# Patient Record
Sex: MALE | HISPANIC OR LATINO | Employment: FULL TIME | URBAN - METROPOLITAN AREA
[De-identification: names, ages, dates, MRNs, and addresses within clinical notes are randomized per-mention and may not be internally consistent; named-entity substitution may affect disease eponyms.]

---
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
## 2021-03-14 ENCOUNTER — APPOINTMENT (OUTPATIENT)
Dept: GENERAL RADIOLOGY | Facility: CLINIC | Age: 21
End: 2021-03-14
Attending: EMERGENCY MEDICINE
Payer: COMMERCIAL

## 2021-03-14 ENCOUNTER — HOSPITAL ENCOUNTER (EMERGENCY)
Facility: CLINIC | Age: 21
Discharge: HOME | End: 2021-03-14
Attending: EMERGENCY MEDICINE | Admitting: EMERGENCY MEDICINE
Payer: COMMERCIAL

## 2021-03-14 VITALS
OXYGEN SATURATION: 99 % | HEART RATE: 85 BPM | TEMPERATURE: 97.9 F | RESPIRATION RATE: 20 BRPM | SYSTOLIC BLOOD PRESSURE: 122 MMHG | DIASTOLIC BLOOD PRESSURE: 80 MMHG

## 2021-03-14 DIAGNOSIS — S20.219A CONTUSION OF CHEST WALL, UNSPECIFIED LATERALITY, INITIAL ENCOUNTER: ICD-10-CM

## 2021-03-14 PROCEDURE — 99284 EMERGENCY DEPT VISIT MOD MDM: CPT | Mod: 25

## 2021-03-14 PROCEDURE — 71120 X-RAY EXAM BREASTBONE 2/>VWS: CPT

## 2021-03-14 PROCEDURE — 250N000013 HC RX MED GY IP 250 OP 250 PS 637: Performed by: EMERGENCY MEDICINE

## 2021-03-14 PROCEDURE — 71046 X-RAY EXAM CHEST 2 VIEWS: CPT

## 2021-03-14 RX ORDER — IBUPROFEN 600 MG/1
600 TABLET, FILM COATED ORAL ONCE
Status: COMPLETED | OUTPATIENT
Start: 2021-03-14 | End: 2021-03-14

## 2021-03-14 RX ADMIN — IBUPROFEN 600 MG: 600 TABLET, FILM COATED ORAL at 01:36

## 2021-03-14 ASSESSMENT — ENCOUNTER SYMPTOMS: SHORTNESS OF BREATH: 1
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
## 2023-06-15 ENCOUNTER — HOSPITAL ENCOUNTER (EMERGENCY)
Facility: CLINIC | Age: 23
Discharge: HOME | End: 2023-06-15
Attending: EMERGENCY MEDICINE | Admitting: EMERGENCY MEDICINE
Payer: COMMERCIAL

## 2023-06-15 VITALS
TEMPERATURE: 97.8 F | DIASTOLIC BLOOD PRESSURE: 89 MMHG | RESPIRATION RATE: 16 BRPM | OXYGEN SATURATION: 98 % | SYSTOLIC BLOOD PRESSURE: 156 MMHG | HEART RATE: 85 BPM

## 2023-06-15 DIAGNOSIS — H16.001 CORNEAL ULCER OF RIGHT EYE: ICD-10-CM

## 2023-06-15 DIAGNOSIS — H10.9 CONJUNCTIVITIS OF RIGHT EYE, UNSPECIFIED CONJUNCTIVITIS TYPE: ICD-10-CM

## 2023-06-15 PROCEDURE — 99283 EMERGENCY DEPT VISIT LOW MDM: CPT

## 2023-06-15 RX ORDER — OFLOXACIN 3 MG/ML
2 SOLUTION/ DROPS OPHTHALMIC 4 TIMES DAILY
Qty: 3 ML | Refills: 0 | Status: SHIPPED | OUTPATIENT
Start: 2023-06-15 | End: 2023-06-22

## 2023-06-15 RX ORDER — PROPARACAINE HYDROCHLORIDE 5 MG/ML
1 SOLUTION/ DROPS OPHTHALMIC ONCE
Status: DISCONTINUED | OUTPATIENT
Start: 2023-06-15 | End: 2023-06-16 | Stop reason: HOSPADM

## 2023-06-15 RX ORDER — ERYTHROMYCIN 5 MG/G
0.5 OINTMENT OPHTHALMIC AT BEDTIME
Qty: 3.5 G | Refills: 0 | Status: SHIPPED | OUTPATIENT
Start: 2023-06-15 | End: 2023-06-22

## 2023-06-15 ASSESSMENT — VISUAL ACUITY
OD: 20/15
OS: 20/20
OD: 20/15
OS: 20/20

## 2023-06-16 NOTE — ED PROVIDER NOTES
History     Chief Complaint:  Eye Pain       The history is provided by the patient.      Demetrius Barnett is a 23 year old male who presents to the ED for evaluation of eye pain. Patient states eye pain started last night. He wore his contacts today and experienced pain worsening throughout wearing them. He notes little pain currently in ED. Patient denies blurry vision or discharge coming from the eye. His boss suggested presenting to ED.    Independent Historian:   None - Patient Only      Medications:    The patient is not currently taking any prescribed medications.    Past Medical History:    History reviewed.  No pertinent past medical history.     Physical Exam     Patient Vitals for the past 24 hrs:   BP Temp Pulse Resp SpO2   06/15/23 2053 (!) 156/89 -- -- -- --   06/15/23 2050 -- 97.8  F (36.6  C) 85 16 98 %     Physical Exam  VS: Reviewed per above  HENT: Mucous membranes moist  EYES:   Visual acuity: Right: 20/15. Left: 20/20.  Pupil Exam: PERRL, no APD  Extraocular movements: intact    Slit Lamp Exam:    Right:  Conjunctiva/Sclera: injected  Cornea: no flourescein uptake- 2 opacified lesions at the margin of the right cornea and scleral at ~4 and 7 oclock. No   No FB appreciated with R upper and lower lid eversion    CV: Rate as noted  RESP: Effort normal.  NEURO: Alert, moving all extremities  MSK: No deformity of the extremities  SKIN: Warm and dry      Emergency Department Course     Emergency Department Course & Assessments:    Interventions:  Medications   proparacaine (ALCAINE) 0.5 % ophthalmic solution 1 drop (has no administration in time range)   fluorescein (FUL-MUNDO) ophthalmic strip 1 strip (has no administration in time range)     Assessments:  2132 I obtained history and examined the patient as noted above.    Independent Interpretation (X-rays, CTs, rhythm strip):  None    Consultations/Discussion of Management or Tests:  None       Disposition:  The patient was discharged to  home.     Impression & Plan    CMS Diagnoses: None    Medical Decision Making:  Patient presents to the ER for evaluation of right eye irritation.  Vital signs reassuring.  He is a contact lens user removed his contacts prior to arrival.  On exam the right sclera/cornea is injected.  I do appear to be to small punctate areas of corneal ulceration at the margin of the right cornea and sclera.  No foreign body of the eye appreciated.  Visual acuity is seemingly intact.  Symptoms improved with topical proparacaine, pointing to anterior chamber source of discomfort.  Low suspicion for acute angle-closure glaucoma.  Plan for antibiotic drops and ointment and follow-up with ophthalmology to ensure more formal ocular exam and appropriate treatment of this infection.  Return precautions discussed prior to discharge.    Diagnosis:    ICD-10-CM    1. Conjunctivitis of right eye, unspecified conjunctivitis type  H10.9       2. Corneal ulcer of right eye  H16.001            Discharge Medications:  New Prescriptions    ERYTHROMYCIN (ROMYCIN) 5 MG/GM OPHTHALMIC OINTMENT    Place 0.5 inches into the right eye At Bedtime for 7 days    OFLOXACIN (OCUFLOX) 0.3 % OPHTHALMIC SOLUTION    Place 2 drops into the right eye 4 times daily for 7 days          Scribe Disclosure:  CASSANDRA, Mayela Gomez, am serving as a scribe at 9:40 PM on 6/15/2023 to document services personally performed by Rbo Horn MD based on my observations and the provider's statements to me.   6/15/2023   Rob Horn MD Lindenbaum, Elan, MD  06/16/23 0031

## 2023-06-16 NOTE — DISCHARGE INSTRUCTIONS
Do not wear contact lenses until cleared by the ophthalmologist. Return for vision changes, worsening symptoms or new concerns.

## 2023-06-16 NOTE — ED TRIAGE NOTES
Pt is a  and contact lens user who started having right eye irritation that has been getting progressively worse since last night. C/o redness and clear drainage     Triage Assessment     Row Name 06/15/23 2051       Respiratory WDL    Respiratory WDL WDL       Cardiac WDL    Cardiac WDL WDL       Cognitive/Neuro/Behavioral WDL    Cognitive/Neuro/Behavioral WDL WDL

## 2024-07-29 ENCOUNTER — OFFICE VISIT (OUTPATIENT)
Dept: URGENT CARE | Facility: URGENT CARE | Age: 24
End: 2024-07-29
Payer: COMMERCIAL

## 2024-07-29 VITALS
HEART RATE: 90 BPM | SYSTOLIC BLOOD PRESSURE: 114 MMHG | TEMPERATURE: 97.9 F | OXYGEN SATURATION: 95 % | DIASTOLIC BLOOD PRESSURE: 67 MMHG

## 2024-07-29 DIAGNOSIS — R30.9 PAINFUL URINATION: Primary | ICD-10-CM

## 2024-07-29 DIAGNOSIS — S39.94XA INJURY TO SCROTUM, PENIS, OR FORESKIN, INITIAL ENCOUNTER: ICD-10-CM

## 2024-07-29 LAB
ALBUMIN UR-MCNC: NEGATIVE MG/DL
APPEARANCE UR: CLEAR
BACTERIA #/AREA URNS HPF: ABNORMAL /HPF
BILIRUB UR QL STRIP: NEGATIVE
COLOR UR AUTO: YELLOW
GLUCOSE UR STRIP-MCNC: NEGATIVE MG/DL
HGB UR QL STRIP: NEGATIVE
KETONES UR STRIP-MCNC: NEGATIVE MG/DL
LEUKOCYTE ESTERASE UR QL STRIP: NEGATIVE
NITRATE UR QL: NEGATIVE
PH UR STRIP: 5.5 [PH] (ref 5–7)
RBC #/AREA URNS AUTO: ABNORMAL /HPF
SP GR UR STRIP: 1.02 (ref 1–1.03)
SQUAMOUS #/AREA URNS AUTO: ABNORMAL /LPF
UROBILINOGEN UR STRIP-ACNC: 0.2 E.U./DL
WBC #/AREA URNS AUTO: ABNORMAL /HPF

## 2024-07-29 PROCEDURE — 81001 URINALYSIS AUTO W/SCOPE: CPT

## 2024-07-29 PROCEDURE — 87591 N.GONORRHOEAE DNA AMP PROB: CPT

## 2024-07-29 PROCEDURE — 87491 CHLMYD TRACH DNA AMP PROBE: CPT

## 2024-07-29 PROCEDURE — 99203 OFFICE O/P NEW LOW 30 MIN: CPT

## 2024-07-29 RX ORDER — MICONAZOLE NITRATE 20 MG/G
CREAM TOPICAL 2 TIMES DAILY
Qty: 141 G | Refills: 0 | Status: SHIPPED | OUTPATIENT
Start: 2024-07-29 | End: 2024-08-05
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
## 2024-07-30 LAB
C TRACH DNA SPEC QL PROBE+SIG AMP: NEGATIVE
N GONORRHOEA DNA SPEC QL NAA+PROBE: NEGATIVE

## 2024-11-23 ENCOUNTER — HOSPITAL ENCOUNTER (EMERGENCY)
Facility: CLINIC | Age: 24
Discharge: HOME | End: 2024-11-23
Attending: PHYSICIAN ASSISTANT | Admitting: PHYSICIAN ASSISTANT

## 2024-11-23 VITALS
TEMPERATURE: 97.7 F | HEIGHT: 69 IN | RESPIRATION RATE: 16 BRPM | DIASTOLIC BLOOD PRESSURE: 80 MMHG | BODY MASS INDEX: 31.1 KG/M2 | WEIGHT: 210 LBS | SYSTOLIC BLOOD PRESSURE: 129 MMHG | HEART RATE: 75 BPM | OXYGEN SATURATION: 99 %

## 2024-11-23 DIAGNOSIS — Z71.1 CONCERN ABOUT STD IN MALE WITHOUT DIAGNOSIS: ICD-10-CM

## 2024-11-23 LAB — HIV 1+2 AB+HIV1 P24 AG SERPL QL IA: NONREACTIVE

## 2024-11-23 PROCEDURE — 87491 CHLMYD TRACH DNA AMP PROBE: CPT | Performed by: PHYSICIAN ASSISTANT

## 2024-11-23 PROCEDURE — 86780 TREPONEMA PALLIDUM: CPT | Performed by: PHYSICIAN ASSISTANT

## 2024-11-23 PROCEDURE — 99284 EMERGENCY DEPT VISIT MOD MDM: CPT

## 2024-11-23 PROCEDURE — 36415 COLL VENOUS BLD VENIPUNCTURE: CPT | Performed by: PHYSICIAN ASSISTANT

## 2024-11-23 PROCEDURE — 250N000013 HC RX MED GY IP 250 OP 250 PS 637: Performed by: PHYSICIAN ASSISTANT

## 2024-11-23 PROCEDURE — 250N000009 HC RX 250: Performed by: PHYSICIAN ASSISTANT

## 2024-11-23 PROCEDURE — 96372 THER/PROPH/DIAG INJ SC/IM: CPT | Performed by: PHYSICIAN ASSISTANT

## 2024-11-23 PROCEDURE — 87591 N.GONORRHOEAE DNA AMP PROB: CPT | Performed by: PHYSICIAN ASSISTANT

## 2024-11-23 PROCEDURE — 250N000011 HC RX IP 250 OP 636: Performed by: PHYSICIAN ASSISTANT

## 2024-11-23 PROCEDURE — 87389 HIV-1 AG W/HIV-1&-2 AB AG IA: CPT | Performed by: PHYSICIAN ASSISTANT

## 2024-11-23 RX ORDER — EMTRICITABINE AND TENOFOVIR DISOPROXIL FUMARATE 200; 300 MG/1; MG/1
1 TABLET, FILM COATED ORAL DAILY
Qty: 28 TABLET | Refills: 0 | Status: SHIPPED | OUTPATIENT
Start: 2024-11-23 | End: 2024-12-21

## 2024-11-23 RX ORDER — DOXYCYCLINE 100 MG/1
100 CAPSULE ORAL ONCE
Status: COMPLETED | OUTPATIENT
Start: 2024-11-23 | End: 2024-11-23

## 2024-11-23 RX ORDER — DOXYCYCLINE 100 MG/1
100 CAPSULE ORAL 2 TIMES DAILY
Qty: 14 CAPSULE | Refills: 0 | Status: SHIPPED | OUTPATIENT
Start: 2024-11-23 | End: 2024-11-30

## 2024-11-23 RX ORDER — EMTRICITABINE AND TENOFOVIR DISOPROXIL FUMARATE 200; 300 MG/1; MG/1
1 TABLET, FILM COATED ORAL ONCE
Status: COMPLETED | OUTPATIENT
Start: 2024-11-23 | End: 2024-11-23

## 2024-11-23 RX ADMIN — EMTRICITABINE AND TENOFOVIR DISOPROXIL FUMARATE 1 TABLET: 200; 300 TABLET, FILM COATED ORAL at 19:56

## 2024-11-23 RX ADMIN — DOXYCYCLINE HYCLATE 100 MG: 100 CAPSULE ORAL at 19:56

## 2024-11-23 RX ADMIN — LIDOCAINE HYDROCHLORIDE 500 MG: 10 INJECTION, SOLUTION INFILTRATION; PERINEURAL at 19:56

## 2024-11-23 RX ADMIN — DOLUTEGRAVIR SODIUM 50 MG: 50 TABLET, FILM COATED ORAL at 19:56

## 2024-11-23 ASSESSMENT — ACTIVITIES OF DAILY LIVING (ADL)
ADLS_ACUITY_SCORE: 0

## 2024-11-23 ASSESSMENT — COLUMBIA-SUICIDE SEVERITY RATING SCALE - C-SSRS
1. IN THE PAST MONTH, HAVE YOU WISHED YOU WERE DEAD OR WISHED YOU COULD GO TO SLEEP AND NOT WAKE UP?: NO
2. HAVE YOU ACTUALLY HAD ANY THOUGHTS OF KILLING YOURSELF IN THE PAST MONTH?: NO
6. HAVE YOU EVER DONE ANYTHING, STARTED TO DO ANYTHING, OR PREPARED TO DO ANYTHING TO END YOUR LIFE?: NO

## 2024-11-24 LAB
C TRACH DNA SPEC QL NAA+PROBE: NEGATIVE
N GONORRHOEA DNA SPEC QL NAA+PROBE: NEGATIVE
T PALLIDUM AB SER QL: NONREACTIVE

## 2024-11-24 NOTE — DISCHARGE INSTRUCTIONS
Take medications as prescribed.  Follow up with your primary doctor for further evaluation and management.  Wear condoms.
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
## 2024-11-30 ENCOUNTER — HEALTH MAINTENANCE LETTER (OUTPATIENT)
Age: 24
End: 2024-11-30

## 2025-06-26 ENCOUNTER — HOSPITAL ENCOUNTER (EMERGENCY)
Facility: CLINIC | Age: 25
Discharge: HOME | End: 2025-06-26
Attending: EMERGENCY MEDICINE | Admitting: EMERGENCY MEDICINE

## 2025-06-26 ENCOUNTER — APPOINTMENT (OUTPATIENT)
Dept: GENERAL RADIOLOGY | Facility: CLINIC | Age: 25
End: 2025-06-26
Attending: EMERGENCY MEDICINE

## 2025-06-26 VITALS
RESPIRATION RATE: 18 BRPM | HEIGHT: 68 IN | TEMPERATURE: 97.6 F | SYSTOLIC BLOOD PRESSURE: 130 MMHG | OXYGEN SATURATION: 96 % | WEIGHT: 223.77 LBS | DIASTOLIC BLOOD PRESSURE: 80 MMHG | BODY MASS INDEX: 33.91 KG/M2 | HEART RATE: 65 BPM

## 2025-06-26 DIAGNOSIS — S60.10XA HEMATOMA, SUBUNGUAL, FINGER, LEFT, INITIAL ENCOUNTER: ICD-10-CM

## 2025-06-26 DIAGNOSIS — S61.313A LACERATION OF LEFT MIDDLE FINGER WITHOUT FOREIGN BODY WITH DAMAGE TO NAIL, INITIAL ENCOUNTER: ICD-10-CM

## 2025-06-26 PROCEDURE — 73140 X-RAY EXAM OF FINGER(S): CPT | Mod: LT

## 2025-06-26 PROCEDURE — 250N000011 HC RX IP 250 OP 636: Performed by: EMERGENCY MEDICINE

## 2025-06-26 PROCEDURE — 12001 RPR S/N/AX/GEN/TRNK 2.5CM/<: CPT | Mod: F2

## 2025-06-26 PROCEDURE — 90715 TDAP VACCINE 7 YRS/> IM: CPT | Performed by: EMERGENCY MEDICINE

## 2025-06-26 PROCEDURE — 99283 EMERGENCY DEPT VISIT LOW MDM: CPT | Mod: 25

## 2025-06-26 PROCEDURE — 11740 EVACUATION SUBUNGUAL HMTMA: CPT | Mod: F2,59

## 2025-06-26 PROCEDURE — 90471 IMMUNIZATION ADMIN: CPT | Performed by: EMERGENCY MEDICINE

## 2025-06-26 RX ADMIN — CLOSTRIDIUM TETANI TOXOID ANTIGEN (FORMALDEHYDE INACTIVATED), CORYNEBACTERIUM DIPHTHERIAE TOXOID ANTIGEN (FORMALDEHYDE INACTIVATED), BORDETELLA PERTUSSIS TOXOID ANTIGEN (GLUTARALDEHYDE INACTIVATED), BORDETELLA PERTUSSIS FILAMENTOUS HEMAGGLUTININ ANTIGEN (FORMALDEHYDE INACTIVATED), BORDETELLA PERTUSSIS PERTACTIN ANTIGEN, AND BORDETELLA PERTUSSIS FIMBRIAE 2/3 ANTIGEN 0.5 ML: 5; 2; 2.5; 5; 3; 5 INJECTION, SUSPENSION INTRAMUSCULAR at 12:32

## 2025-06-26 ASSESSMENT — COLUMBIA-SUICIDE SEVERITY RATING SCALE - C-SSRS
6. HAVE YOU EVER DONE ANYTHING, STARTED TO DO ANYTHING, OR PREPARED TO DO ANYTHING TO END YOUR LIFE?: NO
1. IN THE PAST MONTH, HAVE YOU WISHED YOU WERE DEAD OR WISHED YOU COULD GO TO SLEEP AND NOT WAKE UP?: NO
2. HAVE YOU ACTUALLY HAD ANY THOUGHTS OF KILLING YOURSELF IN THE PAST MONTH?: NO

## 2025-06-26 NOTE — LETTER
June 26, 2025      To Whom It May Concern:      Demetrius Barnett was seen in our Emergency Department today, 06/26/25.  I expect his condition to improve over the next 1 days.  He may return to work/school when improved.    Sincerely,        Windy CIFUENTES RN  Electronically signed

## 2025-06-26 NOTE — DISCHARGE INSTRUCTIONS
Follow-up with your regular doctor as needed.  Try not to soak your hand in water for extended periods of time until the cut on your finger heals.  Return here at any point if you develop any increasing redness or drainage from the laceration or for any other concerns.

## 2025-06-26 NOTE — ED PROVIDER NOTES
"  Emergency Department Note      History of Present Illness     Chief Complaint   Laceration      HPI   Demetrius Barnett is a 25 year old male who presents to the ED for evaluation of laceration. Patient reports that he was at work at [employer] when he was pushing a crate aside he accidentally trapped his left middle finger between causing a laceration to occur on his finger pad. He states that he has no sensation within the finger tip. He endorses that this occurred around 1030 today. He reports that he has no medication allergies and is otherwise a healthy 25 year old.     Independent Historian   None    Review of External Notes   None    Past Medical History     Medical History and Problem List   Right acute serous otitis media    Medications   Truvada  Neosporin  Biktarvy     Surgical History   The patient does not have any past pertinent surgical history.     Physical Exam     Patient Vitals for the past 24 hrs:   BP Temp Temp src Pulse Resp SpO2 Height Weight   06/26/25 1243 130/80 -- -- 65 18 96 % -- --   06/26/25 1241 -- -- -- -- -- 95 % -- --   06/26/25 1240 130/80 -- -- 63 -- -- -- --   06/26/25 1049 (!) 150/64 97.6  F (36.4  C) Temporal 74 17 99 % -- --   06/26/25 1047 -- -- -- -- -- -- 1.727 m (5' 8\") 101.5 kg (223 lb 12.3 oz)     Physical Exam  MSK: Subungal hematoma noted to the left third finger. 1cm laceration noted to the palmar surface of the distal third finger. Neurovascularly intact distally. Intact flexion and extension of the left 3rd digit at all joints.     Diagnostics     Lab Results   Labs Ordered and Resulted from Time of ED Arrival to Time of ED Departure - No data to display    Imaging   Fingers XR, 2-3 views, left   Final Result   IMPRESSION: Soft tissue swelling throughout the distal third finger. No fracture or dislocation. Joint spaces are maintained.           Independent Interpretation   X-ray of left middle finger shows no obvious fracture, dislocation, or foreign body.  "     ED Course      Medications Administered   Medications   Tdap (tetanus-diphtheria-acell pertussis) (ADACEL) injection 0.5 mL (0.5 mLs Intramuscular $Given 6/26/25 1239)       Procedures   Procedures     Laceration Repair      Procedure: Laceration Repair    Indication: Laceration    Consent: Verbal    Tetanus status reviewed 2013    Location: Left L third (middle) finger    Length: 1 cm    Preparation: Irrigation with Sterile Saline.    Anesthesia/Sedation: None      Treatment/Exploration: Wound explored, no foreign bodies found     Closure: The wound was closed with Tissue Adhesive.    Patient Status: The patient tolerated the procedure well: Yes. There were no complications.     Discussion of Management   None    ED Course   ED Course as of 06/26/25 1820   Thu Jun 26, 2025   1213 I obtained history and examined the patient as noted above.    1300 I rechecked the patient. We discussed plans for discharge and the patient is comfortable with this plan. Instructed on supportive care, medications, and reasons to return.        Additional Documentation  None    Medical Decision Making / Diagnosis     CMS Diagnoses: None    MIPS   None               [facility]   Demetrius Barnett is a 25 year old male presenting to the emergency department after a crush injury to the left third finger.  On evaluation here, he has a small laceration noted to the distal palmar surface and a subungual hematoma.  Subungual hematoma was trephinated and his laceration was well-approximated so was closed with Dermabond as noted above.  He was updated on his tetanus.  He will be discharged with close outpatient follow-up.    Disposition   The patient was discharged.     Diagnosis     ICD-10-CM    1. Laceration of left middle finger without foreign body with damage to nail, initial encounter  S61.313A       2. Hematoma, subungual, finger, left, initial encounter  S60.10XA            Discharge Medications   Discharge Medication List as of  6/26/2025 12:36 PM            Scribe Disclosure:  I, Maegansuzanne Thorpegarett, am serving as a scribe at 12:18 PM on 6/26/2025 to document services personally performed by Jonathan Streeter DO based on my observations and the provider's statements to me.        Jonathan Streeter,   06/26/25 1820

## 2025-06-26 NOTE — ED TRIAGE NOTES
Pt states he was working on a vehicle at work and states a grid smashed finger. Pt has small lac to tip of L middle finger.

## 2025-06-26 NOTE — ED NOTES
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 Per MD, pt should make sure to wear gloves but he should be able to return to normal work duties.